# Patient Record
(demographics unavailable — no encounter records)

---

## 2024-12-30 NOTE — HISTORY OF PRESENT ILLNESS
[LMP unknown] : LMP unknown [postmenopausal] : postmenopausal [Y] : Positive pregnancy history [unknown] : Patient is unsure of the date of her LMP [Currently In Menopause] : currently in menopause [Post-Menopause, No Sxs] : post-menopausal, currently without symptoms [Menopause Age: ____] : age at menopause was [unfilled] [No] : Patient does not have concerns regarding sex [Currently Active] : currently active [FreeTextEntry1] : Caridad is a 57 y/o here for her annual GYN exam. She is c/o a vaginal discharge. She also reports her most recent Hgb A1C was 8, she is taking ozempic and metformin and is followed by her PCP who she will be seeing in February. She is due for her mammo in January. She denies changes in her medical history or post-menopausal bleeding.

## 2024-12-30 NOTE — PHYSICAL EXAM
[Chaperone Present] : A chaperone was present in the examining room during all aspects of the physical examination [30836] : A chaperone was present during the pelvic exam. [Appropriately responsive] : appropriately responsive [Alert] : alert [No Acute Distress] : no acute distress [Soft] : soft [Non-tender] : non-tender [Non-distended] : non-distended [Oriented x3] : oriented x3 [Labia Majora] : normal [Labia Minora] : normal [Discharge] : a  ~M vaginal discharge was present [Moderate] : moderate [Kristina] : yellow [Thick] : thick [Normal] : normal [Uterine Adnexae] : normal [FreeTextEntry2] : Lizz

## 2024-12-30 NOTE — PLAN
[FreeTextEntry1] : The patient's history and presentation were reviewed and discussed with Dr. Quach. An assessment was conducted in collaboration with Dr. Quach, and the plan of care was formulated and discussed accordingly.  Pt seen and examined with NP, Aliza Gomes. The analysis and plan were agreed upon and implemented.